# Patient Record
Sex: FEMALE | URBAN - METROPOLITAN AREA
[De-identification: names, ages, dates, MRNs, and addresses within clinical notes are randomized per-mention and may not be internally consistent; named-entity substitution may affect disease eponyms.]

---

## 2020-11-09 ENCOUNTER — APPOINTMENT (RX ONLY)
Dept: URBAN - METROPOLITAN AREA CLINIC 41 | Facility: CLINIC | Age: 80
Setting detail: DERMATOLOGY
End: 2020-11-09

## 2020-11-09 DIAGNOSIS — L60.3 NAIL DYSTROPHY: ICD-10-CM

## 2020-11-09 DIAGNOSIS — L64.8 OTHER ANDROGENIC ALOPECIA: ICD-10-CM

## 2020-11-09 PROCEDURE — ? COUNSELING

## 2020-11-09 PROCEDURE — 99202 OFFICE O/P NEW SF 15 MIN: CPT

## 2020-11-09 PROCEDURE — ? TREATMENT REGIMEN

## 2020-11-09 PROCEDURE — ? ORDER TESTS

## 2020-11-09 ASSESSMENT — LOCATION SIMPLE DESCRIPTION DERM: LOCATION SIMPLE: RIGHT GREAT TOE

## 2020-11-09 ASSESSMENT — LOCATION DETAILED DESCRIPTION DERM: LOCATION DETAILED: RIGHT GREAT TOENAIL

## 2020-11-09 ASSESSMENT — LOCATION ZONE DERM: LOCATION ZONE: TOENAIL

## 2020-11-09 NOTE — PROCEDURE: ORDER TESTS
Performing Laboratory: [id number]
Billing Type: Third-Party Bill
Expected Date Of Service: 11/09/2020
Bill For Surgical Tray: no

## 2020-11-09 NOTE — PROCEDURE: TREATMENT REGIMEN
Otc Regimen: Rogaine extra strength and Biotin
Detail Level: Zone
Plan: Patient is on multiple anti psychotic medications that can be contributing to her hair loss